# Patient Record
Sex: FEMALE | Race: WHITE | NOT HISPANIC OR LATINO | Employment: UNEMPLOYED | URBAN - METROPOLITAN AREA
[De-identification: names, ages, dates, MRNs, and addresses within clinical notes are randomized per-mention and may not be internally consistent; named-entity substitution may affect disease eponyms.]

---

## 2019-06-26 ENCOUNTER — TELEPHONE (OUTPATIENT)
Dept: RHEUMATOLOGY | Facility: CLINIC | Age: 54
End: 2019-06-26

## 2019-06-26 NOTE — TELEPHONE ENCOUNTER
----- Message from Jojo Sales sent at 6/25/2019  3:46 PM CDT -----  Contact: DAUGHTER   She was speaking with someone to schedule an appointment and it was disconnected.A referral was sent by pt's  to see Rheumatology.Please call back at [phone].      Thanks,  Jojo Sales

## 2019-06-26 NOTE — TELEPHONE ENCOUNTER
Spoke with patient. Informed her that we are not accepting new medicaid patients at this time. Pt informed to call Dr. Freitas's office to see if they are accepting new patient .